# Patient Record
Sex: FEMALE | Race: WHITE | NOT HISPANIC OR LATINO | URBAN - METROPOLITAN AREA
[De-identification: names, ages, dates, MRNs, and addresses within clinical notes are randomized per-mention and may not be internally consistent; named-entity substitution may affect disease eponyms.]

---

## 2021-12-17 ENCOUNTER — OFFICE VISIT (OUTPATIENT)
Dept: URBAN - METROPOLITAN AREA CLINIC 107 | Facility: CLINIC | Age: 46
End: 2021-12-17
Payer: COMMERCIAL

## 2021-12-17 ENCOUNTER — WEB ENCOUNTER (OUTPATIENT)
Dept: URBAN - METROPOLITAN AREA CLINIC 107 | Facility: CLINIC | Age: 46
End: 2021-12-17

## 2021-12-17 VITALS
WEIGHT: 145 LBS | BODY MASS INDEX: 25.69 KG/M2 | SYSTOLIC BLOOD PRESSURE: 113 MMHG | DIASTOLIC BLOOD PRESSURE: 81 MMHG | HEIGHT: 63 IN | TEMPERATURE: 97.9 F | RESPIRATION RATE: 18 BRPM | HEART RATE: 73 BPM

## 2021-12-17 DIAGNOSIS — R13.19 ESOPHAGEAL DYSPHAGIA: ICD-10-CM

## 2021-12-17 DIAGNOSIS — K21.9 GASTROESOPHAGEAL REFLUX DISEASE, UNSPECIFIED WHETHER ESOPHAGITIS PRESENT: ICD-10-CM

## 2021-12-17 DIAGNOSIS — R19.7 INTERMITTENT DIARRHEA: ICD-10-CM

## 2021-12-17 DIAGNOSIS — R05.9 COUGH: ICD-10-CM

## 2021-12-17 PROCEDURE — 99244 OFF/OP CNSLTJ NEW/EST MOD 40: CPT | Performed by: INTERNAL MEDICINE

## 2021-12-17 PROCEDURE — 99204 OFFICE O/P NEW MOD 45 MIN: CPT | Performed by: INTERNAL MEDICINE

## 2021-12-17 RX ORDER — PANTOPRAZOLE SODIUM 40 MG/1
1 TABLET TABLET, DELAYED RELEASE ORAL ONCE A DAY
Status: ACTIVE | COMMUNITY

## 2021-12-17 RX ORDER — BACLOFEN 10 MG/1
1 TABLET AS NEEDED TABLET ORAL TWICE A DAY
Status: ACTIVE | COMMUNITY

## 2021-12-17 RX ORDER — AMITRIPTYLINE HYDROCHLORIDE 10 MG/1
1 TABLET AT BEDTIME TABLET, FILM COATED ORAL ONCE A DAY
Status: ACTIVE | COMMUNITY

## 2021-12-17 RX ORDER — PANTOPRAZOLE SODIUM 40 MG/1
1 TABLET TABLET, DELAYED RELEASE ORAL TWICE A DAY
Qty: 180 TABLET | Refills: 3

## 2021-12-17 NOTE — HPI-TODAY'S VISIT:
46-year-old female with a history of anemia, migraines, nontoxic uninodular goiter status post benign biopsy in 2018 is referred by Vandana Gunderson NP for evaluation of GERD.  A copy of today's visit will be forwarded to the referring provider.   Patient states she was diagnosed with GERD in her 20s.  She states she would experience bouts of abdominal cramping and diarrhea after eating food and this was suspected to be secondary to acid reflux.  She continues to have she was started on Prilosec many years ago.  She states this did not help.  Intermittent bouts of lower abdominal cramping and diarrhea following consumption of mostly greasy foods.  Otherwise, she has not been able to correlate her symptoms with any specific foods such as dairy or gluten.  She states the abdominal cramping will resolve with defecation.  In between these bouts she has regular formed bowel movements every 1 to 2 days.  She states this has been her baseline for most of her life.  When asked if she experiences heartburn, she denies any sensation of burning in her chest or in her throat.  She does however have a persistent dry cough and feels as if something is stuck in her throat.  She has been on Protonix 40 mg since she last saw Dr. Chu in February 2020.  She does admit to me that she only takes the PPI when symptoms occur.  She is currently following a GERD diet recommended by dietitian however she states this has been difficult.    She was evaluated by ENT, Dr. Chu, and a laryngoscopy was performed.  This was normal per the patient.  She does have a history of thyroid nodules, and she was concerned that the nodules could be causing her cough.  She was evaluated by her endocrinologist who ruled this out.  She continues to experience occasional dysphagia to solids.  When asked where food gets stuck, she points to the middle of her throat.  She denies dysphagia to liquids.  She had a barium swallow performed at [facility] earlier this year and states it was normal with the exception of "inflammation."  She is a non-smoker and has never smoked tobacco in the past.  She does occasionally drink alcohol.  She recalls one episode of urgent vomiting after drinking half of a allison several months ago.  This has not occurred ever since.  Outside her bouts of diarrhea and abdominal cramping following food, she denies any epigastric pain or other abdominal pain.  She denies nausea and vomiting.  She denies any blood per rectum or melena.  She denies a personal or family history of colon cancer or colon polyps.  She denies a family history of IBD.  She has never had a colonoscopy or upper endoscopy.  Her gallbladder is still intact.

## 2021-12-28 ENCOUNTER — TELEPHONE ENCOUNTER (OUTPATIENT)
Dept: URBAN - METROPOLITAN AREA CLINIC 113 | Facility: CLINIC | Age: 46
End: 2021-12-28

## 2021-12-28 LAB
A/G RATIO: 1.5
ALBUMIN: 4.6
ALKALINE PHOSPHATASE: 126
ALT (SGPT): 57
AST (SGOT): 36
BASO (ABSOLUTE): 0
BASOS: 0
BILIRUBIN, TOTAL: 0.5
BUN/CREATININE RATIO: 21
BUN: 15
CALCIUM: 9.6
CARBON DIOXIDE, TOTAL: 28
CHLORIDE: 100
CREATININE: 0.71
EGFR IF AFRICN AM: 118
EGFR IF NONAFRICN AM: 102
EOS (ABSOLUTE): 0.1
EOS: 3
GLOBULIN, TOTAL: 3
GLUCOSE: 94
HEMATOCRIT: 45.4
HEMATOLOGY COMMENTS:: (no result)
HEMOGLOBIN: 14.7
IMMATURE CELLS: (no result)
IMMATURE GRANS (ABS): 0
IMMATURE GRANULOCYTES: 1
LYMPHS (ABSOLUTE): 1
LYMPHS: 35
MCH: 29.2
MCHC: 32.4
MCV: 90
MONOCYTES(ABSOLUTE): 0.2
MONOCYTES: 8
NEUTROPHILS (ABSOLUTE): 1.5
NEUTROPHILS: 53
NRBC: (no result)
PLATELETS: 206
POTASSIUM: 4.3
PROTEIN, TOTAL: 7.6
RBC: 5.03
RDW: 12.6
SODIUM: 140
T-TRANSGLUTAMINASE (TTG) IGA: <2
WBC: 2.8

## 2022-01-13 ENCOUNTER — WEB ENCOUNTER (OUTPATIENT)
Dept: URBAN - METROPOLITAN AREA CLINIC 107 | Facility: CLINIC | Age: 47
End: 2022-01-13

## 2022-01-23 ENCOUNTER — WEB ENCOUNTER (OUTPATIENT)
Dept: URBAN - METROPOLITAN AREA CLINIC 113 | Facility: CLINIC | Age: 47
End: 2022-01-23

## 2022-01-26 ENCOUNTER — TELEPHONE ENCOUNTER (OUTPATIENT)
Dept: URBAN - METROPOLITAN AREA CLINIC 107 | Facility: CLINIC | Age: 47
End: 2022-01-26

## 2022-02-04 ENCOUNTER — CLAIMS CREATED FROM THE CLAIM WINDOW (OUTPATIENT)
Dept: URBAN - METROPOLITAN AREA CLINIC 4 | Facility: CLINIC | Age: 47
End: 2022-02-04
Payer: COMMERCIAL

## 2022-02-04 ENCOUNTER — OFFICE VISIT (OUTPATIENT)
Dept: URBAN - METROPOLITAN AREA SURGERY CENTER 25 | Facility: SURGERY CENTER | Age: 47
End: 2022-02-04
Payer: COMMERCIAL

## 2022-02-04 DIAGNOSIS — K21.9 GASTRO-ESOPHAGEAL REFLUX DISEASE WITHOUT ESOPHAGITIS: ICD-10-CM

## 2022-02-04 DIAGNOSIS — K31.89 STENOSIS OF STOMACH: ICD-10-CM

## 2022-02-04 DIAGNOSIS — K21.9 ESOPHAGEAL REFLUX DISEASE: ICD-10-CM

## 2022-02-04 PROCEDURE — 88305 TISSUE EXAM BY PATHOLOGIST: CPT | Performed by: PATHOLOGY

## 2022-02-04 PROCEDURE — 43239 EGD BIOPSY SINGLE/MULTIPLE: CPT | Performed by: INTERNAL MEDICINE

## 2022-02-04 PROCEDURE — 88312 SPECIAL STAINS GROUP 1: CPT | Performed by: PATHOLOGY

## 2022-02-04 PROCEDURE — G8907 PT DOC NO EVENTS ON DISCHARG: HCPCS | Performed by: INTERNAL MEDICINE

## 2022-02-04 RX ORDER — PANTOPRAZOLE SODIUM 40 MG/1
1 TABLET TABLET, DELAYED RELEASE ORAL TWICE A DAY
Qty: 180 TABLET | Refills: 3 | Status: ACTIVE | COMMUNITY

## 2022-02-04 RX ORDER — BACLOFEN 10 MG/1
1 TABLET AS NEEDED TABLET ORAL TWICE A DAY
Status: ACTIVE | COMMUNITY

## 2022-02-04 RX ORDER — AMITRIPTYLINE HYDROCHLORIDE 10 MG/1
1 TABLET AT BEDTIME TABLET, FILM COATED ORAL ONCE A DAY
Status: ACTIVE | COMMUNITY

## 2022-03-08 ENCOUNTER — OFFICE VISIT (OUTPATIENT)
Dept: URBAN - METROPOLITAN AREA CLINIC 107 | Facility: CLINIC | Age: 47
End: 2022-03-08
Payer: COMMERCIAL

## 2022-03-08 ENCOUNTER — TELEPHONE ENCOUNTER (OUTPATIENT)
Dept: URBAN - METROPOLITAN AREA CLINIC 113 | Facility: CLINIC | Age: 47
End: 2022-03-08

## 2022-03-08 VITALS
DIASTOLIC BLOOD PRESSURE: 65 MMHG | HEIGHT: 63 IN | HEART RATE: 66 BPM | SYSTOLIC BLOOD PRESSURE: 116 MMHG | BODY MASS INDEX: 26.22 KG/M2 | TEMPERATURE: 98.6 F | WEIGHT: 148 LBS

## 2022-03-08 DIAGNOSIS — R13.19 ESOPHAGEAL DYSPHAGIA: ICD-10-CM

## 2022-03-08 DIAGNOSIS — Z12.11 COLON CANCER SCREENING: ICD-10-CM

## 2022-03-08 DIAGNOSIS — K21.9 GASTROESOPHAGEAL REFLUX DISEASE, UNSPECIFIED WHETHER ESOPHAGITIS PRESENT: ICD-10-CM

## 2022-03-08 DIAGNOSIS — R05.9 COUGH: ICD-10-CM

## 2022-03-08 DIAGNOSIS — R74.8 ELEVATED LIVER ENZYMES: ICD-10-CM

## 2022-03-08 DIAGNOSIS — R19.7 INTERMITTENT DIARRHEA: ICD-10-CM

## 2022-03-08 PROBLEM — 235595009: Status: ACTIVE | Noted: 2021-12-17

## 2022-03-08 PROCEDURE — 99214 OFFICE O/P EST MOD 30 MIN: CPT | Performed by: INTERNAL MEDICINE

## 2022-03-08 RX ORDER — BACLOFEN 10 MG/1
1 TABLET AS NEEDED TABLET ORAL TWICE A DAY
Status: ACTIVE | COMMUNITY

## 2022-03-08 RX ORDER — AMITRIPTYLINE HYDROCHLORIDE 10 MG/1
1 TABLET AT BEDTIME TABLET, FILM COATED ORAL ONCE A DAY
Status: ACTIVE | COMMUNITY

## 2022-03-08 RX ORDER — PANTOPRAZOLE SODIUM 40 MG/1
1 TABLET TABLET, DELAYED RELEASE ORAL TWICE A DAY
Qty: 180 TABLET | Refills: 3 | Status: ACTIVE | COMMUNITY

## 2022-03-08 NOTE — HPI-TODAY'S VISIT:
46-year-old female presenting for follow-up.  She was last seen in the clinic in December 2021. She did have a history of GERD as well as intermittent dysphagia.  For this reason we took her for an upper endoscopy which was completed on February 4, 2022. She did not have any endoscopic abnormality to explain her dysphagia.  She did have gastritis.  Esophageal and gastric biopsies were obtained. Esophageal biopsies did demonstrate reflux-type changes.  There was not any evidence of Torre's esophagus or eosinophilic esophagitis.  Gastric biopsies were negative.   She continues to have intermittent globus sensation.  She has been on twice a day PPI as prescribed.  She denies any nausea or vomiting.  She  denies any abdominal pain.  12/17/21 46-year-old female with a history of anemia, migraines, nontoxic uninodular goiter status post benign biopsy in 2018 is referred by Vandana Gunderson NP for evaluation of GERD.  A copy of today's visit will be forwarded to the referring provider.   Patient states she was diagnosed with GERD in her 20s.  She states she would experience bouts of abdominal cramping and diarrhea after eating food and this was suspected to be secondary to acid reflux.  She continues to have she was started on Prilosec many years ago.  She states this did not help.  Intermittent bouts of lower abdominal cramping and diarrhea following consumption of mostly greasy foods.  Otherwise, she has not been able to correlate her symptoms with any specific foods such as dairy or gluten.  She states the abdominal cramping will resolve with defecation.  In between these bouts she has regular formed bowel movements every 1 to 2 days.  She states this has been her baseline for most of her life.  When asked if she experiences heartburn, she denies any sensation of burning in her chest or in her throat.  She does however have a persistent dry cough and feels as if something is stuck in her throat.  She has been on Protonix 40 mg since she last saw Dr. Chu in February 2020.  She does admit to me that she only takes the PPI when symptoms occur.  She is currently following a GERD diet recommended by dietitian however she states this has been difficult.    She was evaluated by ENT, Dr. Chu, and a laryngoscopy was performed.  This was normal per the patient.  She does have a history of thyroid nodules, and she was concerned that the nodules could be causing her cough.  She was evaluated by her endocrinologist who ruled this out.  She continues to experience occasional dysphagia to solids.  When asked where food gets stuck, she points to the middle of her throat.  She denies dysphagia to liquids.  She had a barium swallow performed at [facility] earlier this year and states it was normal with the exception of "inflammation."  She is a non-smoker and has never smoked tobacco in the past.  She does occasionally drink alcohol.  She recalls one episode of urgent vomiting after drinking half of a allison several months ago.  This has not occurred ever since.  Outside her bouts of diarrhea and abdominal cramping following food, she denies any epigastric pain or other abdominal pain.  She denies nausea and vomiting.  She denies any blood per rectum or melena.  She denies a personal or family history of colon cancer or colon polyps.  She denies a family history of IBD.  She has never had a colonoscopy or upper endoscopy.  Her gallbladder is still intact.

## 2024-03-19 ENCOUNTER — LAB (OUTPATIENT)
Dept: URBAN - METROPOLITAN AREA CLINIC 107 | Facility: CLINIC | Age: 49
End: 2024-03-19

## 2024-03-19 ENCOUNTER — OV EP (OUTPATIENT)
Dept: URBAN - METROPOLITAN AREA CLINIC 107 | Facility: CLINIC | Age: 49
End: 2024-03-19
Payer: COMMERCIAL

## 2024-03-19 VITALS
SYSTOLIC BLOOD PRESSURE: 161 MMHG | WEIGHT: 141 LBS | HEART RATE: 75 BPM | TEMPERATURE: 97.5 F | DIASTOLIC BLOOD PRESSURE: 88 MMHG | HEIGHT: 63 IN | BODY MASS INDEX: 24.98 KG/M2

## 2024-03-19 DIAGNOSIS — Z12.11 COLON CANCER SCREENING: ICD-10-CM

## 2024-03-19 DIAGNOSIS — R10.10 UPPER ABDOMINAL PAIN: ICD-10-CM

## 2024-03-19 DIAGNOSIS — K21.9 GASTROESOPHAGEAL REFLUX DISEASE, UNSPECIFIED WHETHER ESOPHAGITIS PRESENT: ICD-10-CM

## 2024-03-19 DIAGNOSIS — R09.A2 GLOBUS SENSATION: ICD-10-CM

## 2024-03-19 PROCEDURE — 99214 OFFICE O/P EST MOD 30 MIN: CPT | Performed by: INTERNAL MEDICINE

## 2024-03-19 RX ORDER — AMITRIPTYLINE HYDROCHLORIDE 10 MG/1
1 TABLET AT BEDTIME TABLET, FILM COATED ORAL ONCE A DAY
Status: ACTIVE | COMMUNITY

## 2024-03-19 RX ORDER — PANTOPRAZOLE SODIUM 40 MG/1
1 TABLET TABLET, DELAYED RELEASE ORAL TWICE A DAY
Qty: 180 TABLET | Refills: 3 | Status: ACTIVE | COMMUNITY

## 2024-03-19 RX ORDER — BACLOFEN 10 MG/1
1 TABLET AS NEEDED TABLET ORAL TWICE A DAY
Status: ACTIVE | COMMUNITY

## 2024-03-19 RX ORDER — PANTOPRAZOLE SODIUM 40 MG/1
1 TABLET TABLET, DELAYED RELEASE ORAL TWICE A DAY
Qty: 180 | Refills: 1

## 2024-03-19 RX ORDER — SUCRALFATE 1 G/1
1 TABLET ON AN EMPTY STOMACH TABLET ORAL
Qty: 60 | Refills: 3 | OUTPATIENT
Start: 2024-03-19 | End: 2024-07-17

## 2024-03-19 RX ORDER — HYOSCYAMINE SULFATE 0.12 MG/1
1 TABLET UNDER THE TONGUE AND ALLOW TO DISSOLVE TABLET SUBLINGUAL THREE TIMES A DAY
Qty: 90 TABLET | Refills: 1 | OUTPATIENT
Start: 2024-03-19 | End: 2024-05-18

## 2024-03-19 NOTE — HPI-TODAY'S VISIT:
48-year-old female presenting for acid reflux.  History of anemia, migraines, nontoxic uninodular goiter status post benign biopsy in 2018   Last seen 3/8/2022 for EGD follow-up with GERD.  Advised to continue pantoprazole twice a day.  Was reporting dysphagia EGD was negative consider BuSpar.  Has elevated liver enzymes, intermittent diarrhea for which she was avoiding gluten and dairy and was asymptomatic.  She is due for a screening colonoscopy.  She reports continued acid reflux.  She is on pantoprazole as needed. In the past 2 weeks she has had some stomach pain immediately after she eats.  Radiates to upper abdomen, chest and back. She is bloated.  She has cut out tomatoes.  Takes Excedrin as needed for migraines around 1-2 per month as well as with the baclofen. Denies NSAIDs.  She hasn't taken amitriptyline much, takes it when she has migraines to help her sleep. No vomiting.  Continues to have globus sensation unchanged from her last visit.  Chronic altered bowel movements, more frequent stool than constipation.  Weight is down 7 pounds from her last appointment  Follows endocrinology for thyroid nodules.  Sees annually in May.  She has never had a colonoscopy. Her gallbladder is still intact.  12/17/21  Patient states she was diagnosed with GERD in her 20s.  She states she would experience bouts of abdominal cramping and diarrhea after eating food and this was suspected to be secondary to acid reflux.  She continues to have she was started on Prilosec many years ago.  She states this did not help.  Intermittent bouts of lower abdominal cramping and diarrhea following consumption of mostly greasy foods.  Otherwise, she has not been able to correlate her symptoms with any specific foods such as dairy or gluten.  She states the abdominal cramping will resolve with defecation.  In between these bouts she has regular formed bowel movements every 1 to 2 days.  She states this has been her baseline for most of her life.  When asked if she experiences heartburn, she denies any sensation of burning in her chest or in her throat.  She does however have a persistent dry cough and feels as if something is stuck in her throat.  She has been on Protonix 40 mg since she last saw Dr. Chu in February 2020.  She does admit to me that she only takes the PPI when symptoms occur.  She is currently following a GERD diet recommended by dietitian however she states this has been difficult.    She was evaluated by ENT, Dr. Chu, and a laryngoscopy was performed.  This was normal per the patient.  She does have a history of thyroid nodules, and she was concerned that the nodules could be causing her cough.  She was evaluated by her endocrinologist who ruled this out.  She continues to experience occasional dysphagia to solids.  When asked where food gets stuck, she points to the middle of her throat.  She denies dysphagia to liquids.  She had a barium swallow performed at [facility] earlier this year and states it was normal except for "inflammation."  She is a non-smoker and has never smoked tobacco in the past.  She does occasionally drink alcohol.

## 2024-03-19 NOTE — HPI-OTHER HISTORIES
Upper endoscopy which was completed on February 4, 2022. She did not have any endoscopic abnormality to explain her dysphagia. She did have gastritis. Esophageal and gastric biopsies were obtained. Esophageal biopsies did demonstrate reflux-type changes. There was not any evidence of Torre's esophagus or eosinophilic esophagitis. Gastric biopsies were negative.

## 2024-03-22 ENCOUNTER — LAB (OUTPATIENT)
Dept: URBAN - METROPOLITAN AREA CLINIC 4 | Facility: CLINIC | Age: 49
End: 2024-03-22
Payer: COMMERCIAL

## 2024-03-22 ENCOUNTER — EGD (OUTPATIENT)
Dept: URBAN - METROPOLITAN AREA SURGERY CENTER 25 | Facility: SURGERY CENTER | Age: 49
End: 2024-03-22

## 2024-03-22 DIAGNOSIS — K31.89 OTHER DISEASES OF STOMACH AND DUODENUM: ICD-10-CM

## 2024-03-22 PROCEDURE — 88312 SPECIAL STAINS GROUP 1: CPT | Performed by: PATHOLOGY

## 2024-03-22 PROCEDURE — 88305 TISSUE EXAM BY PATHOLOGIST: CPT | Performed by: PATHOLOGY

## 2024-03-22 RX ORDER — AMITRIPTYLINE HYDROCHLORIDE 10 MG/1
1 TABLET AT BEDTIME TABLET, FILM COATED ORAL ONCE A DAY
Status: ACTIVE | COMMUNITY

## 2024-03-22 RX ORDER — SUCRALFATE 1 G/1
1 TABLET ON AN EMPTY STOMACH TABLET ORAL
Qty: 60 | Refills: 3 | Status: ACTIVE | COMMUNITY
Start: 2024-03-19 | End: 2024-07-17

## 2024-03-22 RX ORDER — BACLOFEN 10 MG/1
1 TABLET AS NEEDED TABLET ORAL TWICE A DAY
Status: ACTIVE | COMMUNITY

## 2024-03-22 RX ORDER — PANTOPRAZOLE SODIUM 40 MG/1
1 TABLET TABLET, DELAYED RELEASE ORAL TWICE A DAY
Qty: 180 | Refills: 1 | Status: ACTIVE | COMMUNITY

## 2024-03-22 RX ORDER — HYOSCYAMINE SULFATE 0.12 MG/1
1 TABLET UNDER THE TONGUE AND ALLOW TO DISSOLVE TABLET SUBLINGUAL THREE TIMES A DAY
Qty: 90 TABLET | Refills: 1 | Status: ACTIVE | COMMUNITY
Start: 2024-03-19 | End: 2024-05-18

## 2024-04-16 ENCOUNTER — OV EP (OUTPATIENT)
Dept: URBAN - METROPOLITAN AREA CLINIC 107 | Facility: CLINIC | Age: 49
End: 2024-04-16
Payer: COMMERCIAL

## 2024-04-16 VITALS
HEIGHT: 63 IN | SYSTOLIC BLOOD PRESSURE: 119 MMHG | HEART RATE: 67 BPM | BODY MASS INDEX: 25.52 KG/M2 | TEMPERATURE: 98 F | DIASTOLIC BLOOD PRESSURE: 81 MMHG | WEIGHT: 144 LBS

## 2024-04-16 DIAGNOSIS — K21.9 GASTROESOPHAGEAL REFLUX DISEASE, UNSPECIFIED WHETHER ESOPHAGITIS PRESENT: ICD-10-CM

## 2024-04-16 DIAGNOSIS — R10.10 UPPER ABDOMINAL PAIN: ICD-10-CM

## 2024-04-16 DIAGNOSIS — R14.0 ABDOMINAL BLOATING: ICD-10-CM

## 2024-04-16 DIAGNOSIS — Z12.11 COLON CANCER SCREENING: ICD-10-CM

## 2024-04-16 DIAGNOSIS — R09.A2 GLOBUS SENSATION: ICD-10-CM

## 2024-04-16 PROCEDURE — 99214 OFFICE O/P EST MOD 30 MIN: CPT | Performed by: NURSE PRACTITIONER

## 2024-04-16 RX ORDER — FAMOTIDINE 20 MG/1
1 TABLET TABLET, FILM COATED ORAL TWICE A DAY
Qty: 60 TABLET | Refills: 1 | OUTPATIENT
Start: 2024-04-16

## 2024-04-16 RX ORDER — PANTOPRAZOLE SODIUM 40 MG/1
1 TABLET TABLET, DELAYED RELEASE ORAL TWICE A DAY
Qty: 180 | Refills: 1 | Status: ACTIVE | COMMUNITY

## 2024-04-16 RX ORDER — AMITRIPTYLINE HYDROCHLORIDE 10 MG/1
1 TABLET AT BEDTIME TABLET, FILM COATED ORAL ONCE A DAY
Status: ACTIVE | COMMUNITY

## 2024-04-16 RX ORDER — BACLOFEN 10 MG/1
1 TABLET AS NEEDED TABLET ORAL TWICE A DAY
Status: ACTIVE | COMMUNITY

## 2024-04-16 RX ORDER — SUCRALFATE 1 G/1
1 TABLET ON AN EMPTY STOMACH TABLET ORAL
Qty: 60 | Refills: 3 | Status: ACTIVE | COMMUNITY
Start: 2024-03-19 | End: 2024-07-17

## 2024-04-16 RX ORDER — HYOSCYAMINE SULFATE 0.12 MG/1
1 TABLET UNDER THE TONGUE AND ALLOW TO DISSOLVE TABLET SUBLINGUAL THREE TIMES A DAY
Qty: 90 TABLET | Refills: 1 | Status: ACTIVE | COMMUNITY
Start: 2024-03-19 | End: 2024-05-18

## 2024-04-16 NOTE — HPI-TODAY'S VISIT:
48-year-old female here for an EGD follow-up.  Last seen 3/19/2024 for GERD, globus sensation, upper abdominal pain, abdominal bloating and due for screening colonoscopy. Protonix 40 mg daily was refilled she was started on sucralfate. EGD ordered for further evaluation. For upper abdominal pain she was started on hyoscyamine labs and ultrasound ordered. She declined scheduling colonoscopy.  History of anemia, migraines, nontoxic uninodular goiter status post benign biopsy in 2018   3/19/24 She reports continued acid reflux.  She is on pantoprazole as needed. In the past 2 weeks she has had some stomach pain immediately after she eats.  Radiates to upper abdomen, chest and back. She is bloated.  She has cut out tomatoes.  Takes Excedrin as needed for migraines around 1-2 per month as well as with the baclofen. Denies NSAIDs.  She hasn't taken amitriptyline much, takes it when she has migraines to help her sleep. No vomiting.  Continues to have globus sensation unchanged from her last visit.  Chronic altered bowel movements, more frequent stool than constipation.  Weight is down 7 pounds from her last appointment  Follows endocrinology for thyroid nodules.  Sees annually in May.  She has never had a colonoscopy. Her gallbladder is still intact.  12/17/21  Patient states she was diagnosed with GERD in her 20s.  She states she would experience bouts of abdominal cramping and diarrhea after eating food and this was suspected to be secondary to acid reflux.  She continues to have she was started on Prilosec many years ago.  She states this did not help.  Intermittent bouts of lower abdominal cramping and diarrhea following consumption of mostly greasy foods.  Otherwise, she has not been able to correlate her symptoms with any specific foods such as dairy or gluten.  She states the abdominal cramping will resolve with defecation.  In between these bouts she has regular formed bowel movements every 1 to 2 days.  She states this has been her baseline for most of her life.  When asked if she experiences heartburn, she denies any sensation of burning in her chest or in her throat.  She does however have a persistent dry cough and feels as if something is stuck in her throat.  She has been on Protonix 40 mg since she last saw Dr. Chu in February 2020.  She does admit to me that she only takes the PPI when symptoms occur.  She is currently following a GERD diet recommended by dietitian however she states this has been difficult.    She was previously evaluated by ENT, Dr. Chu, and a laryngoscopy was performed. This was normal per the patient.  She reports continued pain epigastric is now intermittent, before it was constant. Occurs after she eats.  Follows a bland blow fat diet.  She did not try the Carafate. She is on pantoprazole twice daily. If she takes amitriptyline nightly she has significant drowsiness the next day. She does have a history of thyroid nodules, and she was concerned that the nodules could be causing her cough.  She was evaluated by her endocrinologist who ruled this out. Sees Endo again next month.  She continues to experience occasional dysphagia to solids.  When asked where food gets stuck, she points to the middle of her throat.  She denies dysphagia to liquids.  She had a barium swallow performed at [facility] earlier this year and states it was normal except for "inflammation."  She is a non-smoker and has never smoked tobacco in the past.  She does occasionally drink alcohol.

## 2024-04-16 NOTE — HPI-OTHER HISTORIES
Procedures: -Upper endoscopy which was completed on February 4, 2022. She did not have any endoscopic abnormality to explain her dysphagia. She did have gastritis. Esophageal and gastric biopsies were obtained. Esophageal biopsies did demonstrate reflux-type changes. There was not any evidence of Torre's esophagus or eosinophilic esophagitis. Gastric biopsies were negative. -EGD 3/22/2024 revealed diffuse inflammation and erythema in the antrum. EGD otherwise normal. Gastric biopsy revealed no significant abnormality negative for H. pylori.   Abdomen ultrasound 3/25/2024 was normal.  Labs 3/20/2024 CBC normal with Hgb 12.6. CMP normal.

## 2024-06-25 ENCOUNTER — OFFICE VISIT (OUTPATIENT)
Dept: URBAN - METROPOLITAN AREA CLINIC 107 | Facility: CLINIC | Age: 49
End: 2024-06-25
Payer: COMMERCIAL

## 2024-06-25 ENCOUNTER — LAB OUTSIDE AN ENCOUNTER (OUTPATIENT)
Dept: URBAN - METROPOLITAN AREA CLINIC 107 | Facility: CLINIC | Age: 49
End: 2024-06-25

## 2024-06-25 ENCOUNTER — DASHBOARD ENCOUNTERS (OUTPATIENT)
Age: 49
End: 2024-06-25

## 2024-06-25 VITALS
WEIGHT: 145.2 LBS | DIASTOLIC BLOOD PRESSURE: 67 MMHG | RESPIRATION RATE: 18 BRPM | OXYGEN SATURATION: 98 % | BODY MASS INDEX: 25.73 KG/M2 | HEART RATE: 77 BPM | HEIGHT: 63 IN | TEMPERATURE: 98.1 F | SYSTOLIC BLOOD PRESSURE: 104 MMHG

## 2024-06-25 DIAGNOSIS — R10.10 UPPER ABDOMINAL PAIN: ICD-10-CM

## 2024-06-25 DIAGNOSIS — R09.A2 GLOBUS SENSATION: ICD-10-CM

## 2024-06-25 DIAGNOSIS — R14.0 ABDOMINAL BLOATING: ICD-10-CM

## 2024-06-25 DIAGNOSIS — Z12.11 COLON CANCER SCREENING: ICD-10-CM

## 2024-06-25 DIAGNOSIS — K21.9 GASTROESOPHAGEAL REFLUX DISEASE, UNSPECIFIED WHETHER ESOPHAGITIS PRESENT: ICD-10-CM

## 2024-06-25 PROCEDURE — 99214 OFFICE O/P EST MOD 30 MIN: CPT | Performed by: INTERNAL MEDICINE

## 2024-06-25 RX ORDER — SUCRALFATE 1 G/1
1 TABLET ON AN EMPTY STOMACH TABLET ORAL
Qty: 60 | Refills: 3 | Status: ACTIVE | COMMUNITY
Start: 2024-03-19 | End: 2024-07-17

## 2024-06-25 RX ORDER — BACLOFEN 10 MG/1
1 TABLET AS NEEDED TABLET ORAL TWICE A DAY
Status: ACTIVE | COMMUNITY

## 2024-06-25 RX ORDER — FAMOTIDINE 20 MG/1
1 TABLET TABLET, FILM COATED ORAL TWICE A DAY
Qty: 60 TABLET | Refills: 1 | Status: ACTIVE | COMMUNITY
Start: 2024-04-16

## 2024-06-25 RX ORDER — POLYETHYLENE GLYCOL 3350, SODIUM SULFATE, POTASSIUM CHLORIDE, MAGNESIUM SULFATE, AND SODIUM CHLORIDE FOR ORAL SOLUTION 178.7-7.3G
AS DIRECTED KIT ORAL
Qty: 1 KIT | Refills: 0 | OUTPATIENT
Start: 2024-06-25

## 2024-06-25 RX ORDER — AMITRIPTYLINE HYDROCHLORIDE 10 MG/1
1 TABLET AT BEDTIME TABLET, FILM COATED ORAL ONCE A DAY
Status: ACTIVE | COMMUNITY

## 2024-06-25 RX ORDER — PANTOPRAZOLE SODIUM 40 MG/1
1 TABLET TABLET, DELAYED RELEASE ORAL TWICE A DAY
Qty: 180 | Refills: 1 | Status: ACTIVE | COMMUNITY

## 2024-06-25 NOTE — HPI-TODAY'S VISIT:
48-year-old female here for an EGD follow-up.  History of anemia, migraines, nontoxic uninodular goiter status post benign biopsy in 2018  Last seen 4/16/2024 for GERD, globus sensation, upper abdominal pain, bloating and due for screening colonoscopy.  Continue pantoprazole twice a day was started on famotidine 20 mg twice a day.  Consider amitriptyline at night and colonoscopy.  She did not want to schedule at this time.  Discussed HIDA scan she was provided with informational let us know if she wants to schedule.  Previously tried sucralfate and hyoscyamine.  6/25/24 She reports feeling better, not having the epigastric/upper abdominal pain all the time. Only had egg white and decaf coffee this morning. Dysphagia is unchanged.  No vomiting. Intermittent diarrhea, similar to previous.  No melena or hematochzia.   Weight is stable from her last appointment.     4/16/24 She reports continued pain epigastric is now intermittent, before it was constant. Occurs after she eats. Follows a bland blow fat diet. She did not try the Carafate. She is on pantoprazole twice daily. If she takes amitriptyline nightly she has significant drowsiness the next day. She does have a history of thyroid nodules, and she was concerned that the nodules could be causing her cough. She was evaluated by her endocrinologist who ruled this out. Sees Endocrinology again next month. She continues to experience occasional dysphagia to solids. When asked where food gets stuck, she points to the middle of her throat. She denies dysphagia to liquids. She had a barium swallow performed at [facility] earlier this year and states it was normal except for "inflammation." She is a non-smoker and has never smoked tobacco in the past. She does occasionally drink alcohol.   3/19/24 She reports continued acid reflux.  She is on pantoprazole as needed. In the past 2 weeks she has had some stomach pain immediately after she eats.  Radiates to upper abdomen, chest and back. She is bloated.  She has cut out tomatoes.  Takes Excedrin as needed for migraines around 1-2 per month as well as with the baclofen. Denies NSAIDs.  She hasn't taken amitriptyline much, takes it when she has migraines to help her sleep. No vomiting.  Continues to have globus sensation unchanged from her last visit.  Chronic altered bowel movements, more frequent stool than constipation.  Weight is down 7 pounds from her last appointment  Follows endocrinology for thyroid nodules.  Sees annually in May.  She has never had a colonoscopy. Her gallbladder is still intact.  12/17/21 Patient states she was diagnosed with GERD in her 20s.  She states she would experience bouts of abdominal cramping and diarrhea after eating food and this was suspected to be secondary to acid reflux.  She continues to have she was started on Prilosec many years ago.  She states this did not help.  Intermittent bouts of lower abdominal cramping and diarrhea following consumption of mostly greasy foods.  Otherwise, she has not been able to correlate her symptoms with any specific foods such as dairy or gluten.  She states the abdominal cramping will resolve with defecation.  In between these bouts she has regular formed bowel movements every 1 to 2 days.  She states this has been her baseline for most of her life.  When asked if she experiences heartburn, she denies any sensation of burning in her chest or in her throat.  She does however have a persistent dry cough and feels as if something is stuck in her throat.  She has been on Protonix 40 mg since she last saw Dr. Chu in February 2020.  She does admit to me that she only takes the PPI when symptoms occur.  She is currently following a GERD diet recommended by dietitian however she states this has been difficult.    She was previously evaluated by ENT, Dr. Chu, and a laryngoscopy was performed. This was normal per the patient.

## 2024-08-26 ENCOUNTER — OFFICE VISIT (OUTPATIENT)
Dept: URBAN - METROPOLITAN AREA SURGERY CENTER 25 | Facility: SURGERY CENTER | Age: 49
End: 2024-08-26

## 2024-09-16 ENCOUNTER — OFFICE VISIT (OUTPATIENT)
Dept: URBAN - METROPOLITAN AREA SURGERY CENTER 25 | Facility: SURGERY CENTER | Age: 49
End: 2024-09-16
Payer: COMMERCIAL

## 2024-09-16 ENCOUNTER — CLAIMS CREATED FROM THE CLAIM WINDOW (OUTPATIENT)
Dept: URBAN - METROPOLITAN AREA CLINIC 4 | Facility: CLINIC | Age: 49
End: 2024-09-16
Payer: COMMERCIAL

## 2024-09-16 ENCOUNTER — TELEPHONE ENCOUNTER (OUTPATIENT)
Dept: URBAN - METROPOLITAN AREA CLINIC 113 | Facility: CLINIC | Age: 49
End: 2024-09-16

## 2024-09-16 DIAGNOSIS — D12.3 ADENOMA OF TRANSVERSE COLON: ICD-10-CM

## 2024-09-16 DIAGNOSIS — D12.3 ADENOMATOUS POLYP OF TRANSVERSE COLON: ICD-10-CM

## 2024-09-16 DIAGNOSIS — K64.0 FIRST DEGREE HEMORRHOIDS: ICD-10-CM

## 2024-09-16 DIAGNOSIS — D12.3 BENIGN NEOPLASM OF TRANSVERSE COLON: ICD-10-CM

## 2024-09-16 DIAGNOSIS — Z12.11 COLON CANCER SCREENING: ICD-10-CM

## 2024-09-16 DIAGNOSIS — Z12.11 COLON CANCER SCREENING (HIGH RISK): ICD-10-CM

## 2024-09-16 PROCEDURE — 00812 ANES LWR INTST SCR COLSC: CPT | Performed by: ANESTHESIOLOGY

## 2024-09-16 PROCEDURE — 88305 TISSUE EXAM BY PATHOLOGIST: CPT | Performed by: PATHOLOGY

## 2024-09-16 PROCEDURE — 45385 COLONOSCOPY W/LESION REMOVAL: CPT | Performed by: INTERNAL MEDICINE

## 2024-09-16 PROCEDURE — 00812 ANES LWR INTST SCR COLSC: CPT | Performed by: ANESTHESIOLOGIST ASSISTANT

## 2024-09-16 RX ORDER — FAMOTIDINE 20 MG/1
1 TABLET TABLET, FILM COATED ORAL TWICE A DAY
Qty: 60 TABLET | Refills: 1 | Status: ACTIVE | COMMUNITY
Start: 2024-04-16

## 2024-09-16 RX ORDER — PANTOPRAZOLE SODIUM 40 MG/1
1 TABLET TABLET, DELAYED RELEASE ORAL TWICE A DAY
Qty: 180 | Refills: 1 | Status: ACTIVE | COMMUNITY

## 2024-09-16 RX ORDER — AMITRIPTYLINE HYDROCHLORIDE 10 MG/1
1 TABLET AT BEDTIME TABLET, FILM COATED ORAL ONCE A DAY
Status: ACTIVE | COMMUNITY

## 2024-09-16 RX ORDER — BACLOFEN 10 MG/1
1 TABLET AS NEEDED TABLET ORAL TWICE A DAY
Status: ACTIVE | COMMUNITY

## 2024-09-16 RX ORDER — POLYETHYLENE GLYCOL 3350, SODIUM SULFATE, POTASSIUM CHLORIDE, MAGNESIUM SULFATE, AND SODIUM CHLORIDE FOR ORAL SOLUTION 178.7-7.3G
AS DIRECTED KIT ORAL
Qty: 1 KIT | Refills: 0 | Status: ACTIVE | COMMUNITY
Start: 2024-06-25

## 2024-09-24 ENCOUNTER — OFFICE VISIT (OUTPATIENT)
Dept: URBAN - METROPOLITAN AREA CLINIC 107 | Facility: CLINIC | Age: 49
End: 2024-09-24

## 2024-09-30 ENCOUNTER — OFFICE VISIT (OUTPATIENT)
Dept: URBAN - METROPOLITAN AREA CLINIC 107 | Facility: CLINIC | Age: 49
End: 2024-09-30

## 2024-09-30 NOTE — HPI-TODAY'S VISIT:
49-year-old female here for colonoscopy follow-up.  History of anemia, migraines, nontoxic uninodular goiter status post benign biopsy in 2018  Last seen 6/25/2024 for GERD, upper abdominal pain, globus sensation, abdominal bloating and due for screening colonoscopy.  GERD improved on PPI and famotidine twice a day.  If worse recommend HIDA scan.  Consider amitriptyline at bedtime she currently only takes it for her migraines.  Colonoscopy ordered.  Interval history, 9/30/2024 Colonoscopy 9/16/2024.  1 medium 8 mm polyp in the transverse colon which was removed.  Grade 1 internal hemorrhoids.  Polyp was adenomatous.  Due for surveillance colonoscopy in 5 years.  6/25/24 She reports feeling better, not having the epigastric/upper abdominal pain all the time. Only had egg white and decaf coffee this morning. Dysphagia is unchanged.  No vomiting. Intermittent diarrhea, similar to previous.  No melena or hematochzia.   Weight is stable from her last appointment.     4/16/24 She reports continued pain epigastric is now intermittent, before it was constant. Occurs after she eats. Follows a bland blow fat diet. She did not try the Carafate. She is on pantoprazole twice daily. If she takes amitriptyline nightly she has significant drowsiness the next day. She does have a history of thyroid nodules, and she was concerned that the nodules could be causing her cough. She was evaluated by her endocrinologist who ruled this out. Sees Endocrinology again next month. She continues to experience occasional dysphagia to solids. When asked where food gets stuck, she points to the middle of her throat. She denies dysphagia to liquids. She had a barium swallow performed at [facility] earlier this year and states it was normal except for "inflammation." She is a non-smoker and has never smoked tobacco in the past. She does occasionally drink alcohol.  Continue pantoprazole twice a day was started on famotidine 20 mg twice a day. Consider amitriptyline at night and colonoscopy. She did not want to schedule at this time. Discussed HIDA scan she was provided with informational let us know if she wants to schedule.  Previously tried sucralfate and hyoscyamine.   3/19/24 She reports continued acid reflux.  She is on pantoprazole as needed. In the past 2 weeks she has had some stomach pain immediately after she eats.  Radiates to upper abdomen, chest and back. She is bloated.  She has cut out tomatoes.  Takes Excedrin as needed for migraines around 1-2 per month as well as with the baclofen. Denies NSAIDs.  She hasn't taken amitriptyline much, takes it when she has migraines to help her sleep. No vomiting.  Continues to have globus sensation unchanged from her last visit.  Chronic altered bowel movements, more frequent stool than constipation.  Weight is down 7 pounds from her last appointment  Follows endocrinology for thyroid nodules.  Sees annually in May.  She has never had a colonoscopy. Her gallbladder is still intact.  12/17/21 Patient states she was diagnosed with GERD in her 20s.  She states she would experience bouts of abdominal cramping and diarrhea after eating food and this was suspected to be secondary to acid reflux.  She continues to have she was started on Prilosec many years ago.  She states this did not help.  Intermittent bouts of lower abdominal cramping and diarrhea following consumption of mostly greasy foods.  Otherwise, she has not been able to correlate her symptoms with any specific foods such as dairy or gluten.  She states the abdominal cramping will resolve with defecation.  In between these bouts she has regular formed bowel movements every 1 to 2 days.  She states this has been her baseline for most of her life.  When asked if she experiences heartburn, she denies any sensation of burning in her chest or in her throat.  She does however have a persistent dry cough and feels as if something is stuck in her throat.  She has been on Protonix 40 mg since she last saw Dr. Chu in February 2020.  She does admit to me that she only takes the PPI when symptoms occur.  She is currently following a GERD diet recommended by dietitian however she states this has been difficult.    She was previously evaluated by ENT, Dr. Chu, and a laryngoscopy was performed. This was normal per the patient.